# Patient Record
(demographics unavailable — no encounter records)

---

## 2025-01-21 NOTE — ASSESSMENT
[FreeTextEntry1] : 12-year-old female here coming by her father presents today with status post right hand injury.  Patient ports she fell on a flexed hand.  She is reporting pain over the fifth metacarpal head/neck since the time of injury.  Physical examination of the right hand: Mild swelling appreciated laterally.  No ecchymosis or erythema is appreciated skin is intact.  Mild attempt with patient over the fifth MCP joints and fifth head/neck of the metacarpal.  No extensor lag.  No deformity.  No malrotation.  Full flexion extension at all joints.  Good strength wrap.  Sensation is intact distally.  Neuro vas intact.  X-rays of the bilateral hands taken in the office today:  No acute fractures, subluxations, or dislocations.  Treatment plan as discussed:  The patient likely has a contusion of the hand given the patient's history, physical examination findings, and x-ray findings. The patient understands that bone contusions take 4 to 6 weeks to fully heal.  Patient also understands that the first 2 weeks are the worst in terms of pain and swelling.   I recommended anti-inflammatory medication. Patient agrees to taking Advil/Ibuprofen OTC as needed for pain. Benefits discussed. Confirmed no contraindication to NSAIDs.   I recommended patient rest, ice, compress, and elevate the hand regularly. Encouraged activity modification as tolerable. Encouraged gentle range of motion to avoid stiffness. No gym /sports at least until further evaluation.   All questions and concerns addressed to patient's satisfaction. Patient expresses full understanding of treatment plan. Patient will follow up PRN as request.